# Patient Record
Sex: MALE | Race: OTHER | NOT HISPANIC OR LATINO | URBAN - METROPOLITAN AREA
[De-identification: names, ages, dates, MRNs, and addresses within clinical notes are randomized per-mention and may not be internally consistent; named-entity substitution may affect disease eponyms.]

---

## 2021-07-05 ENCOUNTER — EMERGENCY (EMERGENCY)
Facility: HOSPITAL | Age: 36
LOS: 1 days | Discharge: HOME | End: 2021-07-05
Attending: EMERGENCY MEDICINE | Admitting: EMERGENCY MEDICINE
Payer: COMMERCIAL

## 2021-07-05 VITALS
DIASTOLIC BLOOD PRESSURE: 97 MMHG | SYSTOLIC BLOOD PRESSURE: 155 MMHG | RESPIRATION RATE: 17 BRPM | HEART RATE: 77 BPM | TEMPERATURE: 98 F

## 2021-07-05 PROCEDURE — 99284 EMERGENCY DEPT VISIT MOD MDM: CPT

## 2021-07-05 RX ORDER — CEPHALEXIN 500 MG
1 CAPSULE ORAL
Qty: 20 | Refills: 0
Start: 2021-07-05 | End: 2021-07-09

## 2021-07-05 RX ORDER — CEPHALEXIN 500 MG
500 CAPSULE ORAL ONCE
Refills: 0 | Status: COMPLETED | OUTPATIENT
Start: 2021-07-05 | End: 2021-07-05

## 2021-07-05 RX ADMIN — Medication 500 MILLIGRAM(S): at 03:57

## 2021-07-05 NOTE — ED PROVIDER NOTE - NSFOLLOWUPINSTRUCTIONS_ED_ALL_ED_FT
Follow up with your primary care doctor within 48 hours.     A prescription for Keflex (antibiotic) was sent to your pharmacy. Take as directed on packaging. Read medication warnings.   Cellulitis    Cellulitis is a skin infection caused by bacteria. This condition occurs most often in the arms and lower legs but can occur anywhere over the body. Symptoms include redness, swelling, warm skin, tenderness, and chills/fever. If you were prescribed an antibiotic medicine, take it as told by your health care provider. Do not stop taking the antibiotic even if you start to feel better.    SEEK IMMEDIATE MEDICAL CARE IF YOU HAVE ANY OF THE FOLLOWING SYMPTOMS: worsening fever, red streaks coming from affected area, vomiting or diarrhea, or dizziness/lightheadedness.

## 2021-07-05 NOTE — ED ADULT NURSE NOTE - OBJECTIVE STATEMENT
35 year old complaining of right lower extremity pain, redness secondary to cat  scratch 3 days ago. Pt denies fever, chills, chest pain sob, dizziness, headache. Pt denies numbness or tingling in RLE. Pt denies loss of sensation in RLE. Redness to RLE, mild edema, pulse present.  Pt able to feel sensation, healed scratch bites noted on RLE.  Pt medicated.

## 2021-07-05 NOTE — ED PROVIDER NOTE - ATTENDING CONTRIBUTION TO CARE
34yo M obese with no PMH p/w 2 days of RLE redness and mild swelling that began about 1 day after he was scratched on R shin by his new cat (no cat bite). Is up to date on Tdap.  Had some chills but no known fevers. No sob, chest pain, recent surgeries, hormone use, known fam h/o DVT/PE, cancer, or prolonged immobilization.     General: Patient in no apparent distress, AAO x 3  Skin: Dry and intact. right lower leg with redness and tenderness and mild swelling that is mostly anterior and somewhat posterior without calf ttp (located mid shin to above ankle joint). No areas of fluctuance noted  HEENT: Head atraumatic. Oral mucosa moist.   Eyes: Conjunctiva normal  Cardiac: Regular rhythm and rate. No pretibial edema b/l  Respiratory: Lungs clear b/l and symmetric. No respiratory distress. Able to speak in complete sentences.  Gastrointestinal: Abdomen soft, nondistended, nontender  Musculoskeletal: Moves all extremities spontaneously  Neurological: alert and oriented to person, place, and time  Psychiatric: Cooperative    a/p  cellulitis without purulent areas noted  erythema outlined with pen and patient aware of return precautions for new or worsening symptoms incl spread beyond marked area and sob or chest pains  DVT suspicion is low given hx cat scratch prior to symptoms and anterior location of redness/swelling/pain  DC with Keflex and pmd f/u

## 2021-07-05 NOTE — ED PROVIDER NOTE - CLINICAL SUMMARY MEDICAL DECISION MAKING FREE TEXT BOX
Kenan, PGY3 - 35M p/w RLE erythema/swelling x 2 days in setting of cat scratch 3 days ago. VSS, non-toxic appearing. H&P c/w cellulitis, low suspicion for abscess, low risk for PE. Area marked, pt instructed on outpatient f/u, return precautions

## 2021-07-05 NOTE — ED PROVIDER NOTE - PHYSICAL EXAMINATION
I have reviewed the triage vital signs.  Const: AAOx3, in NAD  Eyes: no conjunctival injection  HENT: NCAT, Neck supple, oral mucosa moist  CV: RRR, +S1, S2  Resp: CTAB, no respiratory distress  GI: Abdomen soft, NTND, no guarding  Extremities: 2+ DP pulses  Skin: Warm, well perfused, no rash  MSK: RLE shin (3/4 up from ankle) and ankle erythema/swelling, nontender. No crepitus.   Neuro: No focal sensory or motor deficits  Psych: Appropriate mood and affect

## 2021-07-05 NOTE — ED PROVIDER NOTE - PATIENT PORTAL LINK FT
You can access the FollowMyHealth Patient Portal offered by [facility] by registering at the following website: [email or web address] By joining [employer]’s FollowMyHealth portal, you will also be able to view your health information using other applications (apps) compatible with our system.

## 2021-07-05 NOTE — ED PROVIDER NOTE - OBJECTIVE STATEMENT
35M no PMH p/w RLE redness, swelling x 2 days. Pt states he was scratched by his cat 3 days ago on the R shin. Yesterday had body aches, chills, and R shin pain. Pain has now resolved. Presents today as redness/swelling has progressed to his R ankle & is concerned about an infection. Denies fever. Denies CP, SOB, any history of DVT/PE, malignancy, hormone use, recent surgery, immobilization.

## 2021-07-05 NOTE — ED ADULT TRIAGE NOTE - CHIEF COMPLAINT QUOTE
pt presents to ED for evaluation of RLE pain swelling and redness x1 day. pt axox4 in nad steady gait states he has a new cat that may have scratched his right leg x 3 days ago denies any cp sob dizziness fever chills n+v diarrhea.

## 2021-07-05 NOTE — ED PROVIDER NOTE - NS ED ROS FT
General: Denies fevers  CV: Denies chest pain  Resp: Denies SOB  GI: Denies abdominal pain, nausea, vomiting  : Denies painful urination  Skin: RLE redness/swelling/pain, cat scratch to R shin  Neuro: Denies headache, focal weakness  MSK: +Myalgias resolved. Denies recent trauma

## 2025-08-05 PROBLEM — Z00.00 ENCOUNTER FOR PREVENTIVE HEALTH EXAMINATION: Status: ACTIVE | Noted: 2025-08-05

## 2025-08-06 ENCOUNTER — NON-APPOINTMENT (OUTPATIENT)
Age: 40
End: 2025-08-06

## 2025-08-06 ENCOUNTER — APPOINTMENT (OUTPATIENT)
Dept: INTERNAL MEDICINE | Facility: CLINIC | Age: 40
End: 2025-08-06
Payer: COMMERCIAL

## 2025-08-06 VITALS
HEART RATE: 78 BPM | OXYGEN SATURATION: 99 % | SYSTOLIC BLOOD PRESSURE: 143 MMHG | BODY MASS INDEX: 49.44 KG/M2 | DIASTOLIC BLOOD PRESSURE: 94 MMHG | HEIGHT: 67 IN | WEIGHT: 315 LBS

## 2025-08-06 DIAGNOSIS — E78.00 PURE HYPERCHOLESTEROLEMIA, UNSPECIFIED: ICD-10-CM

## 2025-08-06 DIAGNOSIS — Z78.9 OTHER SPECIFIED HEALTH STATUS: ICD-10-CM

## 2025-08-06 DIAGNOSIS — Z60.2 PROBLEMS RELATED TO LIVING ALONE: ICD-10-CM

## 2025-08-06 DIAGNOSIS — Z87.81 PERSONAL HISTORY OF (HEALED) TRAUMATIC FRACTURE: ICD-10-CM

## 2025-08-06 DIAGNOSIS — I10 ESSENTIAL (PRIMARY) HYPERTENSION: ICD-10-CM

## 2025-08-06 DIAGNOSIS — G47.33 OBSTRUCTIVE SLEEP APNEA (ADULT) (PEDIATRIC): ICD-10-CM

## 2025-08-06 DIAGNOSIS — Z80.3 FAMILY HISTORY OF MALIGNANT NEOPLASM OF BREAST: ICD-10-CM

## 2025-08-06 DIAGNOSIS — E55.9 VITAMIN D DEFICIENCY, UNSPECIFIED: ICD-10-CM

## 2025-08-06 DIAGNOSIS — F41.8 OTHER SPECIFIED ANXIETY DISORDERS: ICD-10-CM

## 2025-08-06 PROCEDURE — G2211 COMPLEX E/M VISIT ADD ON: CPT

## 2025-08-06 PROCEDURE — 99203 OFFICE O/P NEW LOW 30 MIN: CPT

## 2025-08-06 RX ORDER — LOSARTAN POTASSIUM 50 MG/1
50 TABLET, FILM COATED ORAL
Qty: 90 | Refills: 0 | Status: ACTIVE | COMMUNITY
Start: 2025-08-06 | End: 1900-01-01

## 2025-08-06 SDOH — SOCIAL STABILITY - SOCIAL INSECURITY: PROBLEMS RELATED TO LIVING ALONE: Z60.2

## 2025-08-19 ENCOUNTER — APPOINTMENT (OUTPATIENT)
Dept: INTERNAL MEDICINE | Facility: CLINIC | Age: 40
End: 2025-08-19

## 2025-09-23 PROBLEM — H61.23 BILATERAL IMPACTED CERUMEN: Status: ACTIVE | Noted: 2025-09-23

## 2025-09-23 PROBLEM — Q27.2 RENAL ARTERY ANOMALY: Status: ACTIVE | Noted: 2025-09-23
